# Patient Record
Sex: FEMALE | Race: WHITE | NOT HISPANIC OR LATINO | Employment: OTHER | URBAN - METROPOLITAN AREA
[De-identification: names, ages, dates, MRNs, and addresses within clinical notes are randomized per-mention and may not be internally consistent; named-entity substitution may affect disease eponyms.]

---

## 2022-07-08 ENCOUNTER — TELEPHONE (OUTPATIENT)
Dept: PODIATRY | Facility: CLINIC | Age: 72
End: 2022-07-08
Payer: MEDICAID

## 2022-07-08 ENCOUNTER — OFFICE VISIT (OUTPATIENT)
Dept: PODIATRY | Facility: CLINIC | Age: 72
End: 2022-07-08
Payer: MEDICARE

## 2022-07-08 VITALS
SYSTOLIC BLOOD PRESSURE: 139 MMHG | BODY MASS INDEX: 48.73 KG/M2 | HEIGHT: 63 IN | HEART RATE: 75 BPM | DIASTOLIC BLOOD PRESSURE: 71 MMHG | WEIGHT: 275 LBS

## 2022-07-08 DIAGNOSIS — M20.41 HAMMER TOE OF SECOND TOE OF RIGHT FOOT: ICD-10-CM

## 2022-07-08 DIAGNOSIS — B35.1 ONYCHOMYCOSIS OF TOENAIL: ICD-10-CM

## 2022-07-08 DIAGNOSIS — M20.11 HALLUX ABDUCTO VALGUS, RIGHT: ICD-10-CM

## 2022-07-08 DIAGNOSIS — E11.65 TYPE 2 DIABETES MELLITUS WITH HYPERGLYCEMIA, UNSPECIFIED WHETHER LONG TERM INSULIN USE: Primary | ICD-10-CM

## 2022-07-08 PROCEDURE — 3288F FALL RISK ASSESSMENT DOCD: CPT | Mod: CPTII,S$GLB,, | Performed by: PODIATRIST

## 2022-07-08 PROCEDURE — 1126F PR PAIN SEVERITY QUANTIFIED, NO PAIN PRESENT: ICD-10-PCS | Mod: CPTII,S$GLB,, | Performed by: PODIATRIST

## 2022-07-08 PROCEDURE — 99999 PR PBB SHADOW E&M-NEW PATIENT-LVL IV: CPT | Mod: PBBFAC,,, | Performed by: PODIATRIST

## 2022-07-08 PROCEDURE — 1160F PR REVIEW ALL MEDS BY PRESCRIBER/CLIN PHARMACIST DOCUMENTED: ICD-10-PCS | Mod: CPTII,S$GLB,, | Performed by: PODIATRIST

## 2022-07-08 PROCEDURE — 3078F PR MOST RECENT DIASTOLIC BLOOD PRESSURE < 80 MM HG: ICD-10-PCS | Mod: CPTII,S$GLB,, | Performed by: PODIATRIST

## 2022-07-08 PROCEDURE — 1160F RVW MEDS BY RX/DR IN RCRD: CPT | Mod: CPTII,S$GLB,, | Performed by: PODIATRIST

## 2022-07-08 PROCEDURE — 1101F PR PT FALLS ASSESS DOC 0-1 FALLS W/OUT INJ PAST YR: ICD-10-PCS | Mod: CPTII,S$GLB,, | Performed by: PODIATRIST

## 2022-07-08 PROCEDURE — 1159F PR MEDICATION LIST DOCUMENTED IN MEDICAL RECORD: ICD-10-PCS | Mod: CPTII,S$GLB,, | Performed by: PODIATRIST

## 2022-07-08 PROCEDURE — 4010F ACE/ARB THERAPY RXD/TAKEN: CPT | Mod: CPTII,S$GLB,, | Performed by: PODIATRIST

## 2022-07-08 PROCEDURE — 99202 OFFICE O/P NEW SF 15 MIN: CPT | Mod: S$GLB,,, | Performed by: PODIATRIST

## 2022-07-08 PROCEDURE — 1101F PT FALLS ASSESS-DOCD LE1/YR: CPT | Mod: CPTII,S$GLB,, | Performed by: PODIATRIST

## 2022-07-08 PROCEDURE — 4010F PR ACE/ARB THEARPY RXD/TAKEN: ICD-10-PCS | Mod: CPTII,S$GLB,, | Performed by: PODIATRIST

## 2022-07-08 PROCEDURE — 3008F PR BODY MASS INDEX (BMI) DOCUMENTED: ICD-10-PCS | Mod: CPTII,S$GLB,, | Performed by: PODIATRIST

## 2022-07-08 PROCEDURE — 99999 PR PBB SHADOW E&M-NEW PATIENT-LVL IV: ICD-10-PCS | Mod: PBBFAC,,, | Performed by: PODIATRIST

## 2022-07-08 PROCEDURE — 1159F MED LIST DOCD IN RCRD: CPT | Mod: CPTII,S$GLB,, | Performed by: PODIATRIST

## 2022-07-08 PROCEDURE — 3075F PR MOST RECENT SYSTOLIC BLOOD PRESS GE 130-139MM HG: ICD-10-PCS | Mod: CPTII,S$GLB,, | Performed by: PODIATRIST

## 2022-07-08 PROCEDURE — 3288F PR FALLS RISK ASSESSMENT DOCUMENTED: ICD-10-PCS | Mod: CPTII,S$GLB,, | Performed by: PODIATRIST

## 2022-07-08 PROCEDURE — 3078F DIAST BP <80 MM HG: CPT | Mod: CPTII,S$GLB,, | Performed by: PODIATRIST

## 2022-07-08 PROCEDURE — 3075F SYST BP GE 130 - 139MM HG: CPT | Mod: CPTII,S$GLB,, | Performed by: PODIATRIST

## 2022-07-08 PROCEDURE — 3008F BODY MASS INDEX DOCD: CPT | Mod: CPTII,S$GLB,, | Performed by: PODIATRIST

## 2022-07-08 PROCEDURE — 1126F AMNT PAIN NOTED NONE PRSNT: CPT | Mod: CPTII,S$GLB,, | Performed by: PODIATRIST

## 2022-07-08 PROCEDURE — 99202 PR OFFICE/OUTPT VISIT, NEW, LEVL II, 15-29 MIN: ICD-10-PCS | Mod: S$GLB,,, | Performed by: PODIATRIST

## 2022-07-08 RX ORDER — ASCORBIC ACID 500 MG
500 TABLET ORAL DAILY
COMMUNITY

## 2022-07-08 RX ORDER — MULTIVITAMIN
1 TABLET ORAL DAILY
COMMUNITY

## 2022-07-08 RX ORDER — SENNOSIDES 8.6 MG/1
1 TABLET ORAL DAILY
COMMUNITY

## 2022-07-08 RX ORDER — SULFAMETHOXAZOLE AND TRIMETHOPRIM 800; 160 MG/1; MG/1
1 TABLET ORAL DAILY
COMMUNITY
Start: 2022-06-20

## 2022-07-08 RX ORDER — DEXTROAMPHETAMINE SACCHARATE, AMPHETAMINE ASPARTATE, DEXTROAMPHETAMINE SULFATE AND AMPHETAMINE SULFATE 1.25; 1.25; 1.25; 1.25 MG/1; MG/1; MG/1; MG/1
1 TABLET ORAL DAILY
COMMUNITY
Start: 2022-06-02 | End: 2022-07-11 | Stop reason: ALTCHOICE

## 2022-07-08 RX ORDER — AMOXICILLIN AND CLAVULANATE POTASSIUM 875; 125 MG/1; MG/1
1 TABLET, FILM COATED ORAL 2 TIMES DAILY
COMMUNITY
Start: 2022-06-02 | End: 2022-07-11

## 2022-07-08 RX ORDER — CLINDAMYCIN HYDROCHLORIDE 300 MG/1
300 CAPSULE ORAL EVERY 6 HOURS
COMMUNITY
Start: 2022-06-03 | End: 2022-07-11

## 2022-07-08 RX ORDER — CEPHALEXIN 500 MG/1
500 CAPSULE ORAL 3 TIMES DAILY
COMMUNITY
Start: 2022-06-21 | End: 2022-07-11

## 2022-07-08 RX ORDER — AMOXICILLIN 500 MG/1
500 CAPSULE ORAL 3 TIMES DAILY
COMMUNITY
Start: 2022-06-02 | End: 2022-07-11

## 2022-07-08 RX ORDER — DEXTROMETHORPHAN HYDROBROMIDE, GUAIFENESIN 5; 100 MG/5ML; MG/5ML
650 LIQUID ORAL EVERY 6 HOURS PRN
COMMUNITY

## 2022-07-08 RX ORDER — TRAZODONE HYDROCHLORIDE 50 MG/1
12.5 TABLET ORAL NIGHTLY PRN
COMMUNITY
Start: 2022-06-14

## 2022-07-08 RX ORDER — INSULIN DETEMIR 100 [IU]/ML
INJECTION, SOLUTION SUBCUTANEOUS
COMMUNITY
Start: 2022-06-16

## 2022-07-08 RX ORDER — DEXTROAMPHETAMINE SACCHARATE, AMPHETAMINE ASPARTATE, DEXTROAMPHETAMINE SULFATE AND AMPHETAMINE SULFATE 2.5; 2.5; 2.5; 2.5 MG/1; MG/1; MG/1; MG/1
1 TABLET ORAL 2 TIMES DAILY
COMMUNITY
Start: 2022-06-22

## 2022-07-08 RX ORDER — OXYCODONE AND ACETAMINOPHEN 10; 325 MG/1; MG/1
1 TABLET ORAL 4 TIMES DAILY PRN
COMMUNITY
Start: 2022-07-04

## 2022-07-08 RX ORDER — FUROSEMIDE 20 MG/1
20 TABLET ORAL DAILY
COMMUNITY
Start: 2022-07-05

## 2022-07-08 RX ORDER — DULOXETIN HYDROCHLORIDE 30 MG/1
30 CAPSULE, DELAYED RELEASE ORAL 2 TIMES DAILY
COMMUNITY
Start: 2022-07-06

## 2022-07-08 RX ORDER — NYSTATIN 100000 U/G
CREAM TOPICAL 2 TIMES DAILY
COMMUNITY

## 2022-07-08 RX ORDER — AMLODIPINE BESYLATE 2.5 MG/1
2.5 TABLET ORAL DAILY
COMMUNITY
Start: 2022-06-29

## 2022-07-08 RX ORDER — VERAPAMIL HYDROCHLORIDE 120 MG/1
120 TABLET, FILM COATED, EXTENDED RELEASE ORAL DAILY
COMMUNITY
Start: 2022-06-22

## 2022-07-08 RX ORDER — OXYBUTYNIN CHLORIDE 15 MG/1
15 TABLET, EXTENDED RELEASE ORAL DAILY
COMMUNITY
Start: 2022-06-22

## 2022-07-08 RX ORDER — TALC
9 POWDER (GRAM) TOPICAL NIGHTLY PRN
COMMUNITY

## 2022-07-08 RX ORDER — INSULIN GLARGINE-YFGN 100 [IU]/ML
INJECTION, SOLUTION SUBCUTANEOUS
COMMUNITY
Start: 2022-06-28

## 2022-07-08 RX ORDER — GLIMEPIRIDE 2 MG/1
2 TABLET ORAL DAILY
COMMUNITY
Start: 2022-06-21

## 2022-07-08 RX ORDER — CLINDAMYCIN HYDROCHLORIDE 150 MG/1
150 CAPSULE ORAL DAILY PRN
COMMUNITY
Start: 2022-06-04 | End: 2022-07-11

## 2022-07-08 RX ORDER — PRAVASTATIN SODIUM 20 MG/1
20 TABLET ORAL DAILY
COMMUNITY
Start: 2022-06-15

## 2022-07-08 RX ORDER — OMEPRAZOLE 20 MG/1
20 CAPSULE, DELAYED RELEASE ORAL DAILY
COMMUNITY

## 2022-07-08 RX ORDER — ENALAPRIL MALEATE 20 MG/1
20 TABLET ORAL 2 TIMES DAILY
COMMUNITY
Start: 2022-06-18

## 2022-07-08 RX ORDER — INSULIN ASPART 100 [IU]/ML
INJECTION, SOLUTION INTRAVENOUS; SUBCUTANEOUS
COMMUNITY

## 2022-07-08 NOTE — TELEPHONE ENCOUNTER
Spoke with Ladonna instructed to please fax to our office list of medications, drug allergies and pharmacy, understanding verbalized and fax number provided.

## 2022-07-11 NOTE — PROGRESS NOTES
"Subjective:       Patient ID: Collette Oden is a 71 y.o. female.    Chief Complaint: Diabetic Foot Exam  Patient presents with her son from [facility] [place] for diabetic exam.  She relates diagnosis of diabetes 2006. States when glucose is checked it is usually "good." Denies burning or tingling in feet.  Denies foot sores or infections.  Relates having bunion surgery right foot by Dr. Casas years ago.       Past Medical History:   Diagnosis Date    Brain aneurysm     Depression     Diabetes mellitus type I     Hyperlipidemia     Hypertension     Stroke      Past Surgical History:   Procedure Laterality Date    BRAIN SURGERY      BUNIONECTOMY Left     HYSTERECTOMY      KNEE SURGERY Left     TONSILLECTOMY       Family History   Problem Relation Age of Onset    Heart disease Mother     Diabetes Mother     Cancer Mother      Social History     Socioeconomic History    Marital status:    Tobacco Use    Smoking status: Current Some Day Smoker    Smokeless tobacco: Never Used   Substance and Sexual Activity    Alcohol use: Not Currently    Drug use: Not Currently       Current Outpatient Medications   Medication Sig Dispense Refill    acetaminophen (TYLENOL) 650 MG TbSR Take 650 mg by mouth every 6 (six) hours as needed.      amLODIPine (NORVASC) 2.5 MG tablet Take 2.5 mg by mouth once daily.      ascorbic acid, vitamin C, (VITAMIN C) 500 MG tablet Take 500 mg by mouth once daily.      cyanocobalamin, vitamin B-12, 1,000 mcg/mL Kit Inject 1 mcg as directed once daily. Intramuscular one time a day starting on the 10 th and ending on the 11 th every month for supplement      dextroamphetamine-amphetamine 10 mg Tab Take 1 tablet by mouth 2 (two) times daily.      DULoxetine (CYMBALTA) 30 MG capsule Take 30 mg by mouth 2 (two) times daily.      enalapril (VASOTEC) 20 MG tablet Take 20 mg by mouth 2 (two) times daily.      glimepiride (AMARYL) 2 MG tablet Take 2 mg by mouth once daily.  "     insulin aspart U-100 (NOVOLOG) 100 unit/mL injection Inject into the skin as needed for High Blood Sugar (inject as per sliding scale 0-199=0 units; 200-249=3 units; 250-299=6 units;300-349=9 units;350-399=12units; if greater than 400 give 15units and notify MD.). Give before meals, not at bedtime      insulin glargine,hum.rec.anlog (LANTUS SUBQ) Inject 50 Units into the skin every evening.      melatonin (MELATIN) 3 mg tablet Take 9 mg by mouth nightly as needed for Insomnia. Give 3 tablets by mouth at bedtime for insomnia no change in dosage per physician      multivitamin (ONE DAILY MULTIVITAMIN) per tablet Take 1 tablet by mouth once daily.      nystatin (MYCOSTATIN) cream Apply topically 2 (two) times daily.      omeprazole (PRILOSEC) 20 MG capsule Take 20 mg by mouth once daily.      oxybutynin (DITROPAN XL) 15 MG TR24 Take 15 mg by mouth once daily.      oxyCODONE-acetaminophen (PERCOCET)  mg per tablet Take 1 tablet by mouth 4 (four) times daily as needed.      pravastatin (PRAVACHOL) 20 MG tablet Take 20 mg by mouth once daily.      senna (SENOKOT) 8.6 mg tablet Take 1 tablet by mouth once daily.      traZODone (DESYREL) 50 MG tablet Take 12.5 mg by mouth nightly as needed.      verapamiL (CALAN-SR) 120 MG CR tablet Take 120 mg by mouth once daily.      furosemide (LASIX) 20 MG tablet Take 20 mg by mouth once daily.      insulin glargine-yfgn 100 unit/mL (3 mL) InPn Inject into the skin.      LEVEMIR U-100 INSULIN 100 unit/mL injection Inject into the skin.      sulfamethoxazole-trimethoprim 800-160mg (BACTRIM DS) 800-160 mg Tab Take 1 tablet by mouth once daily.       No current facility-administered medications for this visit.     Review of patient's allergies indicates:  No Known Allergies    Review of Systems   HENT: Negative for congestion.    Respiratory: Negative for cough and shortness of breath.    Cardiovascular: Positive for leg swelling.   Musculoskeletal: Positive for  "gait problem.        Wheelchair dependent    All other systems reviewed and are negative.      Objective:      Vitals:    07/08/22 0938   BP: 139/71   Pulse: 75   Weight: 124.7 kg (275 lb)   Height: 5' 3" (1.6 m)     Physical Exam  Vitals and nursing note reviewed. Exam conducted with a chaperone present.   Constitutional:       General: She is not in acute distress.  Cardiovascular:      Pulses:           Dorsalis pedis pulses are 2+ on the right side and 2+ on the left side.        Posterior tibial pulses are 1+ on the right side and 1+ on the left side.   Pulmonary:      Effort: Pulmonary effort is normal.   Musculoskeletal:      Right foot: Decreased range of motion. Deformity (hammertoe 2nd digit right) and bunion present.      Left foot: Decreased range of motion.   Feet:      Right foot:      Protective Sensation: 6 sites tested. 6 sites sensed.      Skin integrity: Erythema (Mild erythema over medial right and deformity 2nd digit) present. No skin breakdown.      Toenail Condition: Right toenails are abnormally thick and long. Fungal disease present.     Left foot:      Protective Sensation: 6 sites tested. 6 sites sensed.      Skin integrity: No skin breakdown or erythema.      Toenail Condition: Left toenails are abnormally thick and long. Fungal disease present.  Skin:     Capillary Refill: Capillary refill takes 2 to 3 seconds.   Neurological:      General: No focal deficit present.   Psychiatric:         Mood and Affect: Mood normal.         Behavior: Behavior normal.                              Assessment:       1. Type 2 diabetes mellitus with hyperglycemia, unspecified whether long term insulin use    2. Hallux abducto valgus, right    3. Hammer toe of second toe of right foot    4. Onychomycosis of toenail        Plan:           Diabetic pedal exam performed.    Reviewed diabetic education  Reviewed bunion deformity and hammertoe right foot  Reviewed wide light appropriate shoes,  especially " indoors to protect feet, should be worn at all times even if not weight-bearing  Reviewed maintenance of skin and nails and potential complications. Nails debrided   Reviewed need for regular foot checks and instructed patient to contact the office with any area of redness or swelling which has not improved within 3 days.  Patient was in understanding and agreement with treatment plan.  I counseled the patient on their conditions, implications and medical management.  Instructed patient to contact the office with any changes, questions, concerns, worsening of symptoms.   Total face to face time, exam, assessment, treatment, discussion, documentation 20 minutes, more than half this time spent on consultation and coordination of care.   Follow up as needed.  Recommended yearly diabetic foot exam      This note was created using M*Modal voice recognition software that occasionally misinterpreted phrases or words.

## 2024-02-08 ENCOUNTER — OFFICE VISIT (OUTPATIENT)
Dept: PODIATRY | Facility: CLINIC | Age: 74
End: 2024-02-08
Payer: MEDICARE

## 2024-02-08 VITALS
BODY MASS INDEX: 47.84 KG/M2 | RESPIRATION RATE: 18 BRPM | WEIGHT: 270 LBS | OXYGEN SATURATION: 97 % | SYSTOLIC BLOOD PRESSURE: 143 MMHG | HEIGHT: 63 IN | HEART RATE: 71 BPM | DIASTOLIC BLOOD PRESSURE: 71 MMHG

## 2024-02-08 DIAGNOSIS — E11.9 COMPREHENSIVE DIABETIC FOOT EXAMINATION, TYPE 2 DM, ENCOUNTER FOR: Primary | ICD-10-CM

## 2024-02-08 DIAGNOSIS — M20.12 HALLUX ABDUCTO VALGUS, BILATERAL: ICD-10-CM

## 2024-02-08 DIAGNOSIS — M20.42 HAMMER TOES OF BOTH FEET: ICD-10-CM

## 2024-02-08 DIAGNOSIS — M20.41 HAMMER TOES OF BOTH FEET: ICD-10-CM

## 2024-02-08 DIAGNOSIS — M20.11 HALLUX ABDUCTO VALGUS, BILATERAL: ICD-10-CM

## 2024-02-08 DIAGNOSIS — E11.42 TYPE 2 DIABETES MELLITUS WITH PERIPHERAL NEUROPATHY: ICD-10-CM

## 2024-02-08 DIAGNOSIS — B35.1 ONYCHOMYCOSIS OF TOENAIL: ICD-10-CM

## 2024-02-08 DIAGNOSIS — L84 PRE-ULCERATIVE CALLUSES: ICD-10-CM

## 2024-02-08 PROCEDURE — 99215 OFFICE O/P EST HI 40 MIN: CPT | Mod: PBBFAC | Performed by: PODIATRIST

## 2024-02-08 PROCEDURE — 1126F AMNT PAIN NOTED NONE PRSNT: CPT | Mod: CPTII,S$GLB,, | Performed by: PODIATRIST

## 2024-02-08 PROCEDURE — 3288F FALL RISK ASSESSMENT DOCD: CPT | Mod: CPTII,S$GLB,, | Performed by: PODIATRIST

## 2024-02-08 PROCEDURE — 1159F MED LIST DOCD IN RCRD: CPT | Mod: CPTII,S$GLB,, | Performed by: PODIATRIST

## 2024-02-08 PROCEDURE — 3078F DIAST BP <80 MM HG: CPT | Mod: CPTII,S$GLB,, | Performed by: PODIATRIST

## 2024-02-08 PROCEDURE — 1100F PTFALLS ASSESS-DOCD GE2>/YR: CPT | Mod: CPTII,S$GLB,, | Performed by: PODIATRIST

## 2024-02-08 PROCEDURE — 99214 OFFICE O/P EST MOD 30 MIN: CPT | Mod: S$GLB,,, | Performed by: PODIATRIST

## 2024-02-08 PROCEDURE — 99999 PR PBB SHADOW E&M-EST. PATIENT-LVL V: CPT | Mod: PBBFAC,,, | Performed by: PODIATRIST

## 2024-02-08 PROCEDURE — 3008F BODY MASS INDEX DOCD: CPT | Mod: CPTII,S$GLB,, | Performed by: PODIATRIST

## 2024-02-08 PROCEDURE — 3077F SYST BP >= 140 MM HG: CPT | Mod: CPTII,S$GLB,, | Performed by: PODIATRIST

## 2024-02-08 PROCEDURE — 4010F ACE/ARB THERAPY RXD/TAKEN: CPT | Mod: CPTII,S$GLB,, | Performed by: PODIATRIST

## 2024-02-08 RX ORDER — INSULIN ASPART 100 [IU]/ML
INJECTION, SOLUTION INTRAVENOUS; SUBCUTANEOUS
COMMUNITY
Start: 2024-01-04

## 2024-02-08 RX ORDER — DEXTROAMPHETAMINE SACCHARATE, AMPHETAMINE ASPARTATE, DEXTROAMPHETAMINE SULFATE AND AMPHETAMINE SULFATE 1.25; 1.25; 1.25; 1.25 MG/1; MG/1; MG/1; MG/1
1 TABLET ORAL
COMMUNITY
Start: 2024-01-09

## 2024-02-08 RX ORDER — TIRZEPATIDE 5 MG/.5ML
INJECTION, SOLUTION SUBCUTANEOUS
COMMUNITY
Start: 2024-02-05

## 2024-02-10 NOTE — PROGRESS NOTES
Subjective:       Patient ID: Collette Oden is a 73 y.o. female.    Chief Complaint: Diabetic Foot Exam  Patient presents from past [facility] nursing [place] the history of diabetes, stroke, aneurysm.  Relates a proximally 20 year history of diabetes.  Does not know her A1c.  Does relate nursing home checks her glucose multiple times a day.  To her knowledge it is fairly well controlled.    Has neuropathy, takes Cymbalta come on pain management   Denies history of foot sores or infections.  Wheelchair dependent but can transfer.   Very active with family    Past Medical History:   Diagnosis Date    Brain aneurysm     Depression     Diabetes mellitus type I     Hyperlipidemia     Hypertension     Stroke      Past Surgical History:   Procedure Laterality Date    BRAIN SURGERY      BUNIONECTOMY Left     HYSTERECTOMY      KNEE SURGERY Left     TONSILLECTOMY       Family History   Problem Relation Age of Onset    Heart disease Mother     Diabetes Mother     Cancer Mother      Social History     Socioeconomic History    Marital status:    Tobacco Use    Smoking status: Some Days    Smokeless tobacco: Never   Substance and Sexual Activity    Alcohol use: Not Currently    Drug use: Not Currently       Current Outpatient Medications   Medication Sig Dispense Refill    acetaminophen (TYLENOL) 650 MG TbSR Take 650 mg by mouth every 6 (six) hours as needed.      amLODIPine (NORVASC) 2.5 MG tablet Take 2.5 mg by mouth once daily.      ascorbic acid, vitamin C, (VITAMIN C) 500 MG tablet Take 500 mg by mouth once daily.      cyanocobalamin, vitamin B-12, 1,000 mcg/mL Kit Inject 1 mcg as directed once daily. Intramuscular one time a day starting on the 10 th and ending on the 11 th every month for supplement      dextroamphetamine-amphetamine 10 mg Tab Take 1 tablet by mouth 2 (two) times daily.      dextroamphetamine-amphetamine 5 mg Tab Take 1 tablet by mouth.      DULoxetine (CYMBALTA) 30 MG capsule Take 30 mg by mouth 2  (two) times daily.      enalapril (VASOTEC) 20 MG tablet Take 20 mg by mouth 2 (two) times daily.      furosemide (LASIX) 20 MG tablet Take 20 mg by mouth once daily.      glimepiride (AMARYL) 2 MG tablet Take 2 mg by mouth once daily.      insulin aspart U-100 (NOVOLOG) 100 unit/mL injection Inject into the skin as needed for High Blood Sugar (inject as per sliding scale 0-199=0 units; 200-249=3 units; 250-299=6 units;300-349=9 units;350-399=12units; if greater than 400 give 15units and notify MDCely). Give before meals, not at bedtime      insulin glargine,hum.rec.anlog (LANTUS SUBQ) Inject 50 Units into the skin every evening.      insulin glargine-yfgn 100 unit/mL (3 mL) InPn Inject into the skin.      LEVEMIR U-100 INSULIN 100 unit/mL injection Inject into the skin.      melatonin (MELATIN) 3 mg tablet Take 9 mg by mouth nightly as needed for Insomnia. Give 3 tablets by mouth at bedtime for insomnia no change in dosage per physician      MOUNJARO 5 mg/0.5 mL PnIj Inject into the skin.      multivitamin (ONE DAILY MULTIVITAMIN) per tablet Take 1 tablet by mouth once daily.      NOVOLOG FLEXPEN U-100 INSULIN 100 unit/mL (3 mL) InPn pen Inject into the skin.      nystatin (MYCOSTATIN) cream Apply topically 2 (two) times daily.      omeprazole (PRILOSEC) 20 MG capsule Take 20 mg by mouth once daily.      oxybutynin (DITROPAN XL) 15 MG TR24 Take 15 mg by mouth once daily.      oxyCODONE-acetaminophen (PERCOCET)  mg per tablet Take 1 tablet by mouth 4 (four) times daily as needed.      pravastatin (PRAVACHOL) 20 MG tablet Take 20 mg by mouth once daily.      senna (SENOKOT) 8.6 mg tablet Take 1 tablet by mouth once daily.      sulfamethoxazole-trimethoprim 800-160mg (BACTRIM DS) 800-160 mg Tab Take 1 tablet by mouth once daily.      traZODone (DESYREL) 50 MG tablet Take 12.5 mg by mouth nightly as needed.      verapamiL (CALAN-SR) 120 MG CR tablet Take 120 mg by mouth once daily.       No current  "facility-administered medications for this visit.     Review of patient's allergies indicates:   Allergen Reactions    Dye Other (See Comments)    Iodinated contrast media Rash       Review of Systems   Musculoskeletal:  Positive for gait problem.   All other systems reviewed and are negative.      Objective:      Vitals:    02/08/24 1116   BP: (!) 143/71   BP Location: Left arm   Patient Position: Sitting   BP Method: Large (Automatic)   Pulse: 71   Resp: 18   SpO2: 97%   Weight: 122.5 kg (270 lb)   Height: 5' 3" (1.6 m)     Physical Exam  Vitals and nursing note reviewed.   Constitutional:       General: She is not in acute distress.     Appearance: Normal appearance.   Cardiovascular:      Pulses:           Dorsalis pedis pulses are 2+ on the right side and 2+ on the left side.        Posterior tibial pulses are 1+ on the right side and 1+ on the left side.   Musculoskeletal:         General: Deformity present. No tenderness.      Right foot: Decreased range of motion. Deformity (Hammertoes 2nd bilateral) and bunion present.      Left foot: Decreased range of motion. Deformity and bunion present.      Comments: Limited mobility, foot deformities   Feet:      Right foot:      Protective Sensation: 5 sites tested.  5 sites sensed.      Skin integrity: Callus present. No skin breakdown.      Toenail Condition: Right toenails are abnormally thick. Fungal disease present.     Left foot:      Protective Sensation: 5 sites tested.  5 sites sensed.      Skin integrity: No skin breakdown.      Toenail Condition: Left toenails are abnormally thick. Fungal disease present.  Skin:     Capillary Refill: Capillary refill takes 2 to 3 seconds.   Neurological:      General: No focal deficit present.      Mental Status: She is alert.      Comments: Sensation intact all areas bilateral feet   Psychiatric:         Mood and Affect: Mood normal.                          Assessment:       1. Comprehensive diabetic foot examination, " type 2 DM, encounter for    2. Type 2 diabetes mellitus with peripheral neuropathy    3. Hallux abducto valgus, bilateral    4. Hammer toes of both feet    5. Pre-ulcerative calluses    6. Onychomycosis of toenail        Plan:             Comprehensive diabetic pedal exam performed  Reviewed good sensation in feet reviewed  Diabetic neuropathy at length  Reviewed benefit of controled glucose/diabetes regarding potential foot salt complications    Bunions at length with patient, she had surgical correction in the past on the 1 ft and related to patient she still has bunion deformity.  It is much worse on the opposite foot.  Elevated 2nd digits. This puts her at risk for rubbing, friction sores, blisters, wounds  Reviewed wide appropriate shoes soft material,  especially indoors to protect feet    Discussed care and maintenance of skin and potential complications  Reviewed potential complications regarding callus right great toe, callus debrided, no discoloration, no complications  Reviewed care and maintenance of nails, nails debrided  Reviewed need for daily foot checks and instructed patient to contact the office with any area of redness or swelling which has not improved within 3 days.  Patient was in understanding and agreement with treatment plan.  I counseled the patient on their conditions, implications and medical management.  Instructed patient/family to contact the office with any changes, questions, concerns, worsening of symptoms.   Total face to face time 30 minutes, exam, assessment, treatment, discussion, additional time for review of chart prior to and following appointment and visit documentation, consultation and coordination of care.    Follow up as needed    This note was created using M*Modal voice recognition software that occasionally misinterpreted phrases or words.

## 2024-05-09 ENCOUNTER — OFFICE VISIT (OUTPATIENT)
Dept: PODIATRY | Facility: CLINIC | Age: 74
End: 2024-05-09
Payer: MEDICARE

## 2024-05-09 VITALS
HEIGHT: 63 IN | HEART RATE: 81 BPM | BODY MASS INDEX: 48.02 KG/M2 | DIASTOLIC BLOOD PRESSURE: 59 MMHG | WEIGHT: 271 LBS | SYSTOLIC BLOOD PRESSURE: 129 MMHG

## 2024-05-09 DIAGNOSIS — M19.071 ARTHRITIS OF BOTH FEET: ICD-10-CM

## 2024-05-09 DIAGNOSIS — B35.1 ONYCHOMYCOSIS OF TOENAIL: ICD-10-CM

## 2024-05-09 DIAGNOSIS — M79.672 BILATERAL FOOT PAIN: ICD-10-CM

## 2024-05-09 DIAGNOSIS — M20.42 HAMMER TOES OF BOTH FEET: ICD-10-CM

## 2024-05-09 DIAGNOSIS — M19.072 ARTHRITIS OF BOTH FEET: ICD-10-CM

## 2024-05-09 DIAGNOSIS — M20.41 HAMMER TOES OF BOTH FEET: ICD-10-CM

## 2024-05-09 DIAGNOSIS — M20.11 HALLUX ABDUCTO VALGUS, RIGHT: Primary | ICD-10-CM

## 2024-05-09 DIAGNOSIS — M79.671 BILATERAL FOOT PAIN: ICD-10-CM

## 2024-05-09 PROCEDURE — 3074F SYST BP LT 130 MM HG: CPT | Mod: CPTII,S$GLB,, | Performed by: PODIATRIST

## 2024-05-09 PROCEDURE — 1101F PT FALLS ASSESS-DOCD LE1/YR: CPT | Mod: CPTII,S$GLB,, | Performed by: PODIATRIST

## 2024-05-09 PROCEDURE — 99999 PR PBB SHADOW E&M-EST. PATIENT-LVL V: CPT | Mod: PBBFAC,,, | Performed by: PODIATRIST

## 2024-05-09 PROCEDURE — 3008F BODY MASS INDEX DOCD: CPT | Mod: CPTII,S$GLB,, | Performed by: PODIATRIST

## 2024-05-09 PROCEDURE — 99213 OFFICE O/P EST LOW 20 MIN: CPT | Mod: S$GLB,,, | Performed by: PODIATRIST

## 2024-05-09 PROCEDURE — 1125F AMNT PAIN NOTED PAIN PRSNT: CPT | Mod: CPTII,S$GLB,, | Performed by: PODIATRIST

## 2024-05-09 PROCEDURE — 3288F FALL RISK ASSESSMENT DOCD: CPT | Mod: CPTII,S$GLB,, | Performed by: PODIATRIST

## 2024-05-09 PROCEDURE — 1159F MED LIST DOCD IN RCRD: CPT | Mod: CPTII,S$GLB,, | Performed by: PODIATRIST

## 2024-05-09 PROCEDURE — 3078F DIAST BP <80 MM HG: CPT | Mod: CPTII,S$GLB,, | Performed by: PODIATRIST

## 2024-05-09 PROCEDURE — 4010F ACE/ARB THERAPY RXD/TAKEN: CPT | Mod: CPTII,S$GLB,, | Performed by: PODIATRIST

## 2024-05-09 RX ORDER — MIRABEGRON 50 MG/1
1 TABLET, EXTENDED RELEASE ORAL
COMMUNITY

## 2024-05-09 RX ORDER — VERAPAMIL HYDROCHLORIDE 120 MG/1
120 CAPSULE, EXTENDED RELEASE ORAL
COMMUNITY
Start: 2024-05-04

## 2024-05-09 RX ORDER — LOPERAMIDE HCL 2 MG
2 TABLET ORAL 4 TIMES DAILY PRN
COMMUNITY

## 2024-05-09 RX ORDER — GLIMEPIRIDE 4 MG/1
4 TABLET ORAL
COMMUNITY
Start: 2024-05-03

## 2024-05-09 RX ORDER — DIVALPROEX SODIUM 125 MG/1
125 TABLET, DELAYED RELEASE ORAL 2 TIMES DAILY
COMMUNITY
Start: 2024-05-08

## 2024-05-09 RX ORDER — FLUCONAZOLE 100 MG/1
100 TABLET ORAL
COMMUNITY
Start: 2024-05-07

## 2024-05-09 RX ORDER — CHOLECALCIFEROL (VITAMIN D3) 25 MCG
1000 TABLET,CHEWABLE ORAL
COMMUNITY
Start: 2024-03-26

## 2024-05-09 RX ORDER — ONDANSETRON 4 MG/1
4 TABLET, FILM COATED ORAL EVERY 8 HOURS PRN
COMMUNITY

## 2024-05-09 RX ORDER — NYSTATIN 100000 [USP'U]/G
POWDER TOPICAL 2 TIMES DAILY
COMMUNITY

## 2024-05-09 RX ORDER — OXYCODONE AND ACETAMINOPHEN 7.5; 325 MG/1; MG/1
1 TABLET ORAL
COMMUNITY
Start: 2024-05-07

## 2024-05-09 RX ORDER — NYSTATIN 100000 U/G
OINTMENT TOPICAL
COMMUNITY
Start: 2024-05-07

## 2024-05-09 RX ORDER — TIRZEPATIDE 2.5 MG/.5ML
INJECTION, SOLUTION SUBCUTANEOUS
COMMUNITY
Start: 2024-04-16

## 2024-05-09 RX ORDER — VENLAFAXINE HYDROCHLORIDE 75 MG/1
75 CAPSULE, EXTENDED RELEASE ORAL
COMMUNITY

## 2024-05-09 RX ORDER — DULOXETIN HYDROCHLORIDE 20 MG/1
20 CAPSULE, DELAYED RELEASE ORAL
COMMUNITY
Start: 2024-05-03

## 2024-05-09 RX ORDER — INSULIN GLARGINE 100 [IU]/ML
INJECTION, SOLUTION SUBCUTANEOUS
COMMUNITY
Start: 2024-04-22

## 2024-05-09 RX ORDER — MELOXICAM 15 MG/1
15 TABLET ORAL
COMMUNITY

## 2024-05-09 RX ORDER — HYDROCHLOROTHIAZIDE 25 MG/1
25 TABLET ORAL
COMMUNITY

## 2024-05-09 RX ORDER — FLUCONAZOLE 150 MG/1
150 TABLET ORAL
COMMUNITY
Start: 2023-12-04 | End: 2024-05-11

## 2024-05-11 NOTE — PROGRESS NOTES
Subjective:       Patient ID: Collette Oden is a 73 y.o. female.    Chief Complaint: Diabetes Mellitus, Follow-up, and Nail Problem  Patient presents from [facility] for follow up due to diabetes. Has a history of stroke, aneurysm surgery x2.  Relates pain in her toes.  Does have neuropathy in states it is pretty well-controlled with medication but pain in toes recently has been 8/10    Past Medical History:   Diagnosis Date    Brain aneurysm     Depression     Diabetes mellitus type I     Hyperlipidemia     Hypertension     Stroke      Past Surgical History:   Procedure Laterality Date    BRAIN SURGERY      BUNIONECTOMY Left     HYSTERECTOMY      KNEE SURGERY Left     TONSILLECTOMY       Family History   Problem Relation Name Age of Onset    Heart disease Mother      Diabetes Mother      Cancer Mother       Social History     Socioeconomic History    Marital status:    Tobacco Use    Smoking status: Some Days    Smokeless tobacco: Never   Substance and Sexual Activity    Alcohol use: Not Currently    Drug use: Not Currently       Current Outpatient Medications   Medication Sig Dispense Refill    acetaminophen (TYLENOL) 650 MG TbSR Take 650 mg by mouth every 6 (six) hours as needed.      amLODIPine (NORVASC) 2.5 MG tablet Take 2.5 mg by mouth once daily.      ascorbic acid, vitamin C, (VITAMIN C) 500 MG tablet Take 500 mg by mouth once daily.      cyanocobalamin, vitamin B-12, 1,000 mcg Cap 1,000 mcg.      dextroamphetamine-amphetamine 5 mg Tab Take 1 tablet by mouth.      dextromethorphan-guaiFENesin  mg (MUCINEX DM)  mg per 12 hr tablet Take 1 tablet by mouth every 12 (twelve) hours.      divalproex (DEPAKOTE) 125 MG EC tablet Take 125 mg by mouth 2 (two) times daily.      DULoxetine (CYMBALTA) 20 MG capsule Take 20 mg by mouth.      DULoxetine (CYMBALTA) 30 MG capsule Take 30 mg by mouth 2 (two) times daily.      enalapril (VASOTEC) 20 MG tablet Take 20 mg  by mouth 2 (two) times daily.      fluconazole (DIFLUCAN) 100 MG tablet Take 100 mg by mouth.      furosemide (LASIX) 20 MG tablet Take 20 mg by mouth once daily.      glimepiride (AMARYL) 2 MG tablet Take 2 mg by mouth once daily.      glimepiride (AMARYL) 4 MG tablet Take 4 mg by mouth.      hydroCHLOROthiazide (HYDRODIURIL) 25 MG tablet Take 25 mg by mouth.      insulin aspart U-100 (NOVOLOG) 100 unit/mL injection Inject into the skin as needed for High Blood Sugar (inject as per sliding scale 0-199=0 units; 200-249=3 units; 250-299=6 units;300-349=9 units;350-399=12units; if greater than 400 give 15units and notify MD.). Give before meals, not at bedtime      insulin glargine,hum.rec.anlog (LANTUS SUBQ) Inject 50 Units into the skin every evening.      insulin glargine-yfgn 100 unit/mL (3 mL) InPn Inject into the skin.      LANTUS SOLOSTAR U-100 INSULIN 100 unit/mL (3 mL) InPn pen Inject into the skin.      LEVEMIR U-100 INSULIN 100 unit/mL injection Inject into the skin.      loperamide (IMODIUM A-D) 2 mg Tab Take 2 mg by mouth 4 (four) times daily as needed.      melatonin (MELATIN) 3 mg tablet Take 9 mg by mouth nightly as needed for Insomnia. Give 3 tablets by mouth at bedtime for insomnia no change in dosage per physician      meloxicam (MOBIC) 15 MG tablet Take 15 mg by mouth.      mirabegron (MYRBETRIQ) 50 mg Tb24 Take 1 tablet by mouth.      MOUNJARO 2.5 mg/0.5 mL PnIj Inject into the skin.      MOUNJARO 5 mg/0.5 mL PnIj Inject into the skin.      multivitamin (ONE DAILY MULTIVITAMIN) per tablet Take 1 tablet by mouth once daily.      NOVOLOG FLEXPEN U-100 INSULIN 100 unit/mL (3 mL) InPn pen Inject into the skin.      nystatin (MYCOSTATIN) ointment Apply topically.      nystatin (MYCOSTATIN) powder Apply topically 2 (two) times daily.      omeprazole (PRILOSEC) 20 MG capsule Take 20 mg by mouth once daily.      ondansetron (ZOFRAN) 4 MG tablet Take 4 mg by mouth every 8 (eight) hours as needed.       "oxybutynin (DITROPAN XL) 15 MG TR24 Take 15 mg by mouth once daily.      oxyCODONE-acetaminophen (PERCOCET)  mg per tablet Take 1 tablet by mouth 4 (four) times daily as needed.      oxyCODONE-acetaminophen (PERCOCET) 7.5-325 mg per tablet Take 1 tablet by mouth.      pravastatin (PRAVACHOL) 20 MG tablet Take 20 mg by mouth once daily.      senna (SENOKOT) 8.6 mg tablet Take 1 tablet by mouth once daily.      sulfamethoxazole-trimethoprim 800-160mg (BACTRIM DS) 800-160 mg Tab Take 1 tablet by mouth once daily.      traZODone (DESYREL) 50 MG tablet Take 12.5 mg by mouth nightly as needed.      venlafaxine (EFFEXOR-XR) 75 MG 24 hr capsule Take 75 mg by mouth.      verapamiL (CALAN-SR) 120 MG CR tablet Take 120 mg by mouth once daily.      verapamiL (VERELAN) 120 MG C24P Take 120 mg by mouth.       No current facility-administered medications for this visit.     Review of patient's allergies indicates:   Allergen Reactions    Dye Other (See Comments)    Tuberculin ppd Other (See Comments)     unkown    Iodinated contrast media Rash       Review of Systems   Musculoskeletal:  Positive for gait problem.   All other systems reviewed and are negative.      Objective:      Vitals:    05/09/24 0955   BP: (!) 129/59   Pulse: 81   Weight: 122.9 kg (271 lb)   Height: 5' 3" (1.6 m)     Physical Exam  Vitals and nursing note reviewed.   Constitutional:       Appearance: Normal appearance.   Cardiovascular:      Pulses:           Dorsalis pedis pulses are 1+ on the right side and 1+ on the left side.        Posterior tibial pulses are 1+ on the right side and 1+ on the left side.   Musculoskeletal:         General: Tenderness and deformity present.      Right foot: Decreased range of motion. Deformity (Hammertoes 2nd bilateral) and bunion present.      Left foot: Decreased range of motion. Deformity present.      Comments: Limited mobility, foot deformities   Feet:      Right foot:      Skin integrity: Callus present. No " skin breakdown.      Toenail Condition: Right toenails are abnormally thick. Fungal disease present.     Left foot:      Skin integrity: No skin breakdown.      Toenail Condition: Left toenails are abnormally thick. Fungal disease present.  Skin:     Capillary Refill: Capillary refill takes 2 to 3 seconds.   Neurological:      Mental Status: She is alert.      Comments: Sensation intact all areas bilateral feet   Psychiatric:         Behavior: Behavior normal.                    Assessment:       1. Hallux abducto valgus, right    2. Hammer toes of both feet    3. Arthritis of both feet    4. Bilateral foot pain    5. Onychomycosis of toenail          Plan:           Reviewed arthritis in feet with patient, bunion left foot, digital deformities left foot, correction of bunion right foot in the past with limited range of motion  Reviewed arthritis in toes  Reviewed topical and conservative treatments  Reviewed wide appropriate shoes soft material,  especially indoors to protect feet    Discussed care and maintenance of skin and potential complications  Reviewed potential complications regarding callus right great toe, callus debrided, no discoloration, no complications  Reviewed care and maintenance of nails, nails debrided  Reviewed diabetic education and potential complications with feet  Reviewed the importance of regular foot checks, alert nurse with any area of concern which has not improved in a few days  Patient was in understanding and agreement with treatment plan.  I counseled the patient on their conditions, implications and medical management.  Instructed patient to contact the office with any changes, questions, concerns, worsening of symptoms.   Total face to face time 20 minutes, exam, assessment, treatment, discussion, additional time for review of chart prior to and following appointment and visit documentation, consultation and coordination of care.    Follow up as needed    This note was created  using M*Modal voice recognition software that occasionally misinterpreted phrases or words.